# Patient Record
Sex: FEMALE | Race: OTHER | NOT HISPANIC OR LATINO
[De-identification: names, ages, dates, MRNs, and addresses within clinical notes are randomized per-mention and may not be internally consistent; named-entity substitution may affect disease eponyms.]

---

## 2019-12-16 ENCOUNTER — RESULT REVIEW (OUTPATIENT)
Age: 69
End: 2019-12-16

## 2023-11-28 NOTE — ASU PATIENT PROFILE, ADULT - REASON FOR ADMISSION, PROFILE
Pars Plana Vitrectomy, Membrane Peel, Possible Inner Limiting Membrane Peel, Possible Gas Insertion, Right Eye

## 2023-11-28 NOTE — ASU PATIENT PROFILE, ADULT - NSICDXPASTSURGICALHX_GEN_ALL_CORE_FT
PAST SURGICAL HISTORY:  History of gastric polyp removal 9/2010    S/P hysterectomy 1/2001    S/P partial lobectomy of lung bilateral    S/P right knee surgery

## 2023-11-28 NOTE — ASU PATIENT PROFILE, ADULT - NSTOBACCONEVERSMOKERY/N_GEN_A
Follow up with your PCP in 3 - 4 days if worsening or not improving:    Rona Toth MD  [address] [place] / [place] [address]  [phone]      Patient Education     Reducing the Risk of Middle Ear Infections     Good handwashing can help your child prevent ear infections.     Most children have had at least one middle ear infection by the age of 2. Treatment may depend on whether the problem is acute or chronic. It also depends on how often it comes back and how long it lasts.  Reducing risk factors  Some behaviors or surroundings increase your child’s risk of ear infection. Reducing such risk factors can be helpful at any point in treatment. The tips below may help:  · If your child goes to group , he or she runs a greater risk of getting colds or flu. This may then lead to an ear infection. Help prevent these illnesses by teaching your child to wash his or her hands often.  · If your child has nasal allergies, do your best to control dust, mold, mildew, and pet hair in the house. Also stop or greatly limit your child’s contact with secondhand smoke.  · If food allergies are a problem, identify the food that triggers the reaction. Help your child avoid it.  Watching and waiting  Sometimes it is better to proceed with caution in the following ways:  · If your child is diagnosed with an ear infection, the healthcare provider may prescribe antibiotics and will suggest a period of “watchful waiting.” This means not filling any prescriptions right away. Instead of antibiotics, a trial of medicines to relieve symptoms including those for pain or fever, is advised. This is along with waiting some time to see if a child improves without antibiotic therapy. Whether or not your healthcare provider prescribes immediate antibiotics or a period of watchful waiting depends on your child's age and risk factors.  · During this time, your child should be watched to see if his or her symptoms are improving and  to make sure new symptoms, such as fever or vomiting, don't develop. If a child doesn't improve within a few days or develops new symptoms, antibiotics will usually be started.    Date Last Reviewed: 12/1/2016 © 2000-2018 [employer]. [address] [place] [address]. All rights reserved. This information is not intended as a substitute for professional medical care. Always follow your healthcare professional's instructions.           Patient Education     Coronavirus Disease 2019 (COVID-19): Overview  Coronavirus disease 2019 (COVID-19) is a respiratory illness. It's caused by a new (novel) coronavirus. There are many types of coronavirus. Coronaviruses are a very common cause of colds and bronchitis. They may sometimes cause lung infection (pneumonia). Symptoms can range from mild to severe. Some people have no symptoms. These viruses are also found in some animals.   All 50 states in the [place] have reported cases of COVID-19. Most states report \"community spread\" of COVID-19. This means the source of the illness is not known. COVID-19 is a rapidly-emerging infectious disease. This means that scientists are actively researching it. There are information updates regularly.   Public health officials are working to find the source. How the virus spreads is not yet fully understood, but it seems to spread and infect people fairly easily. Some people who have been infected in an area may not be sure how or where they were infected. The virus may be spread through droplets of fluid that a person coughs or sneezes into the air. It may be spread if you touch a surface with the virus on it, such as a handle or object, and then touch your eyes, nose, or mouth.   For the latest information, visit the CDC website at www.cdc.gov/coronavirus/2019-ncov. Or call 072-RNW-XANQ (705-680-8028).   What are the symptoms of COVID-19?  Some people have no symptoms or mild symptoms. Symptoms can also vary from  person to person. As experts learn more about COVID-19, other symptoms are being reported. Symptoms may appear 2 to 14 days after contact with the virus:   · Fever  · Coughing  · Trouble breathing or feeling short of breath  · Sore throat  · Runny nose  · Headache and body aches  · Chills or repeated shaking with chills  · Fatigue  · Loss of appetite  · Nausea, vomiting, diarrhea, or abdominal pain  · Loss of sense of smell and taste  You can check your symptoms with the CDC’s Coronavirus Self-.   What are possible complications from COVID-19?  In many cases, this virus can cause infection (pneumonia) in both lungs. In some cases, this can cause death. Certain people are at higher risk for complications. This includes older adults and people with serious chronic health conditions such as heart or lung disease, diabetes, or kidney disease. It includes people with health conditions that suppress the immune system. And it includes people taking medicines that suppress the immune system.   As experts learn more about COVID-19, other complications are being reported that may be linked to COVID-19. Rarely, some children have developed severe complications called multisystem inflammatory syndrome in children (MIS-C). MIS-C seems to be similar to [place] disease, a rare condition causing inflammation of blood vessels and body organs. It's not yet known if MIS-C happens only in children, or if adults are also at risk. It's also not known if it's related to COVID-19, because many children, but not all, have tested positive for the virus. Experts continue to study MIS-C. The CDC advises healthcare providers to report to local health departments any person under age 21 years old who is ill enough to be in the hospital and has all of the following:   · A fever over 100.4°F (38.0°C) for more than 24 hours and a positive SARS-CoV-2 test or exposure to the virus in the last 4 weeks  · Inflammation in at least 2 organs such  as the heart, lungs, or kidneys with lab tests that show inflammation  · No other diagnoses besides COVID-19 explain the child's symptoms  How is COVID-19 diagnosed?  Your healthcare provider will ask about your symptoms. He or she will ask where you live, and about your recent travel, and any contact with sick people. If your healthcare provider thinks you may have COVID-19, he or she will consider whether to test you for COVID-19. This depends on the availability of testing in your area, and how sick you are. Follow all instructions from your healthcare provider. Guidelines for testing may change as more information about the virus becomes available. Currently, COVID-19 is diagnosed by:   · Nose-throat swab.  A swab is wiped inside your nose to the back of your throat. This is a viral test to tell you if you have a current COVID-19 infection.  If your healthcare provider thinks or confirms that you have COVID-19, you may have other tests. These tests may include:   · Antibody blood test.  Antibody tests are being looked at to find out if a person has previously been infected with the virus and may now have antibodies such as SARS AB IgG in their blood to give some immunity. The accuracy and availability of antibody tests vary. An antibody test may not be able to show if you have a current infection because it can take up to a few weeks after infection to make antibodies. It's not yet known how long immunity lasts after being infected with the virus.  · Sputum culture.  A small sample of mucus coughed from your lungs (sputum) may be collected if you have a moist cough. It may be checked for the virus or to look for pneumonia.  · Imaging tests.  You may have a chest X-ray or CT scan.  How is COVID-19 treated?  There is currently no medicine proven to prevent or treat the virus. Some experimental medicines are being tested for COVID-19. Other medicines used to treat other conditions are being looked at for COVID-19,  but these are not currently approved to treat it.   The most proven treatments right now are those to help your body while it fights the virus. This is known as supportive care. Supportive care may include:   · Getting rest.  This helps your body fight the illness.  · Staying hydrated.  Drinking liquids is the best way to prevent dehydration.. Try to drink 6 to 8 glasses of liquids every day, or as advised by your provider. Also check with your provider about which fluids are best for you. Don't drink fluids that contain caffeine or alcohol.  · Taking over-the-counter (OTC) pain medicine.  These are used to help ease pain and reduce fever. Follow your healthcare provider's instructions for which OTC medicine to use.  For severe illness, you may need to stay in the hospital. Care during severe illness may include:   · IV (intravenous) fluids.  These are given through a vein to help keep your body hydrated.  · Oxygen. You may be given supplemental oxygen or ventilation with a breathing machine (ventilator). This is done so you get enough oxygen in your body.  · Prone positioning.  Depending on how sick you are during your hospital stay, your healthcare team may turn you regularly on your stomach. This is called prone positioning. It helps increase the amount of oxygen you get to your lungs. Follow your healthcare team's instructions on position changes while you're in the hospital. Also follow their discharge advice on the best positions to help your breathing once you go home.  People who have had COVID-19 and are fully recovered may be asked by their healthcare team to consider donating plasma. This is called COVID-19 convalescent plasma donation. Plasma from people fully recovered from COVID-19 may contain antibodies to help fight COVID-19 in people who are currently seriously ill with the disease. It's not fully known if the donated plasma will work well as a treatment, but the FDA is looking at it and has asked  [employer] to help with plasma donation and collection. Talk with your provider to learn more about convalescent plasma donation and whether you qualify to donate.   Are you at risk for COVID-19?  You are at risk for COVID-19 if you have had close contact with someone with the virus, or if you live in or traveled to an area with cases of it. Close contact means being within about 6 feet of someone, or living in the same house or visiting a person who has or may have COVID-19. Some recent studies suggest that COVID-19 may be spread by people who are not showing symptoms.   Date last modified: 5/20/2020  [employer] last reviewed this educational content on 1/1/2020  © 2731-2576 [employer]. [address] [place] [address], [place] [address]. All rights reserved. This information is not intended as a substitute for professional medical care. Always follow your healthcare professional's instructions.            [facility]

## 2023-11-28 NOTE — ASU PATIENT PROFILE, ADULT - NS PREOP UNDERSTANDS INFO
No solid food/dairy/candy/gum after 9:30pm  tonight; water allowed before 04:30am tomorrow; patient to come with photo ID/insurance/credit card; no jewelries/contact lens/valuables; dress in comfortable clothes; no smoking/alcohol drinking/recreational drug use tonight; escort to come with photo ID; address and callback number was given/yes

## 2023-11-29 ENCOUNTER — TRANSCRIPTION ENCOUNTER (OUTPATIENT)
Age: 73
End: 2023-11-29

## 2023-11-29 ENCOUNTER — OUTPATIENT (OUTPATIENT)
Dept: OUTPATIENT SERVICES | Facility: HOSPITAL | Age: 73
LOS: 1 days | Discharge: HOME | End: 2023-11-29

## 2023-11-29 VITALS
TEMPERATURE: 99 F | DIASTOLIC BLOOD PRESSURE: 55 MMHG | HEART RATE: 65 BPM | RESPIRATION RATE: 16 BRPM | SYSTOLIC BLOOD PRESSURE: 125 MMHG | OXYGEN SATURATION: 95 %

## 2023-11-29 VITALS — SYSTOLIC BLOOD PRESSURE: 173 MMHG | DIASTOLIC BLOOD PRESSURE: 75 MMHG | HEART RATE: 51 BPM

## 2023-11-29 DIAGNOSIS — Z90.710 ACQUIRED ABSENCE OF BOTH CERVIX AND UTERUS: Chronic | ICD-10-CM

## 2023-11-29 DIAGNOSIS — Z90.2 ACQUIRED ABSENCE OF LUNG [PART OF]: Chronic | ICD-10-CM

## 2023-11-29 DIAGNOSIS — Z98.890 OTHER SPECIFIED POSTPROCEDURAL STATES: Chronic | ICD-10-CM

## 2023-11-29 DIAGNOSIS — Z87.19 PERSONAL HISTORY OF OTHER DISEASES OF THE DIGESTIVE SYSTEM: Chronic | ICD-10-CM

## 2023-11-29 RX ORDER — ATROPINE SULFATE 1 %
1 DROPS OPHTHALMIC (EYE)
Refills: 0 | Status: COMPLETED | OUTPATIENT
Start: 2023-11-29 | End: 2023-11-29

## 2023-11-29 RX ORDER — PHENYLEPHRINE HCL 2.5 %
1 DROPS OPHTHALMIC (EYE)
Refills: 0 | Status: COMPLETED | OUTPATIENT
Start: 2023-11-29 | End: 2023-11-29

## 2023-11-29 RX ORDER — HYDROCHLOROTHIAZIDE 25 MG
1 TABLET ORAL
Refills: 0 | DISCHARGE

## 2023-11-29 RX ORDER — OSIMERTINIB 80 1/1
80 TABLET, FILM COATED ORAL
Refills: 0 | DISCHARGE

## 2023-11-29 RX ORDER — LOSARTAN POTASSIUM 100 MG/1
1 TABLET, FILM COATED ORAL
Refills: 0 | DISCHARGE

## 2023-11-29 RX ORDER — SODIUM CHLORIDE 9 MG/ML
500 INJECTION, SOLUTION INTRAVENOUS
Refills: 0 | Status: DISCONTINUED | OUTPATIENT
Start: 2023-11-29 | End: 2023-11-29

## 2023-11-29 RX ORDER — TROPICAMIDE 1 %
1 DROPS OPHTHALMIC (EYE)
Refills: 0 | Status: COMPLETED | OUTPATIENT
Start: 2023-11-29 | End: 2023-11-29

## 2023-11-29 RX ORDER — AMLODIPINE BESYLATE 2.5 MG/1
1 TABLET ORAL
Refills: 0 | DISCHARGE

## 2023-11-29 RX ORDER — FENTANYL CITRATE 50 UG/ML
25 INJECTION INTRAVENOUS
Refills: 0 | Status: DISCONTINUED | OUTPATIENT
Start: 2023-11-29 | End: 2023-11-29

## 2023-11-29 RX ORDER — CARVEDILOL PHOSPHATE 80 MG/1
1 CAPSULE, EXTENDED RELEASE ORAL
Refills: 0 | DISCHARGE

## 2023-11-29 RX ADMIN — Medication 1 DROP(S): at 06:55

## 2023-11-29 RX ADMIN — Medication 1 DROP(S): at 06:50

## 2023-11-29 RX ADMIN — Medication 1 DROP(S): at 07:00

## 2023-11-29 NOTE — ASU DISCHARGE PLAN (ADULT/PEDIATRIC) - NS MD DC FALL RISK RISK
For information on Fall & Injury Prevention, visit: [email or web address] OR  [email or web address] OR  https://www.cdc.gov/steadi/patient.html

## 2023-11-29 NOTE — OPERATIVE REPORT - NSICDXBRIEFPROCEDURE_GEN_ALL_CORE_FT
PROCEDURES:  Vitrectomy, mechanical, pars plana approach, with removal of internal limiting membrane of retina 29-Nov-2023 08:29:15  Geovanny Ellington

## 2023-11-29 NOTE — PRE-ANESTHESIA EVALUATION ADULT - NSANTHOSAYNRD_GEN_A_CORE
No. GISELLA screening performed.  STOP BANG Legend: 0-2 = LOW Risk; 3-4 = INTERMEDIATE Risk; 5-8 = HIGH Risk

## 2023-11-29 NOTE — OPERATIVE REPORT - OPERATIVE RPOSRT DETAILS
REPAIR OF MACULAR PUCKER     PATIENT NAME: TWAN OWENS    ATTENDING: Geovanny Ellington MD    PREOPERATIVE DIAGNOSIS(ES):  Macular pucker, right eye     POSTOPERATIVE DIAGNOSIS(ES):  Macular pucker, right eye      PROCEDURE: Pars plana vitrectomy, membrane peel with removal of internal limiting membrane, air-fluid exchange - all of the right eye     INDICATIONS:       The patient is a 73y year-old Female  with a history of a macular pucker in the operative eye. After a detailed review of the risks, benefits and alternatives of the procedure, including but not limited to bleeding, infection, inflammation, retinal detachment, loss of vision, loss of eye, double vision, unclear visual potential, need for further surgery, and systemic risks of anesthesia and surgery, informed consent was obtained and the patient elected to proceed with the surgery.      PROCEDURE:       On the day of surgery, after clearance by medicine and anesthesia, the patient was brought to the operating room in stable condition. After verifying the correct surgical site, the patient was placed in the supine position. Intravenous sedation was provided by the anesthesia team.  After a brief time-out, a 1:1 mixture of 2% lidocaine and 0.75% Marcaine was injected in a retrobulbar fashion behind the operative eye. The patient was then prepped and draped in the usual sterile fashion.  Eyelashes were draped out of the operative field and a stainless steel eyelid speculum was placed in the operative eye.  A time-out was performed verifying correct patient, procedure, site, positioning and special equipment prior to starting the case.      A 25-gauge microcannula was placed in the inferotemporal quadrant in a beveled fashion 3.5 mm posterior to the limbus. The infusion cannula was cleared of air, inserted into the microcannula, confirmed to be in the correct position within the vitreous cavity by direct visualization through the dilated pupil with the light pipe and then turned on under direct visualization. Two additional microcannulas were placed superonasally and superotemporally in a similar fashion. The vitrectomy hand piece and light were then inserted into the eye and the anterior vitreous was cleared under direct visualization.       Then, under indirect wide field visualization, a core and peripheral vitrectomy was performed. The posterior hyaloid was  using active aspiration and Kenalog assistance.  The vitreous was excised 360 degrees to the posterior vitreous base and no retinal breaks were noted.        Attention was then turned to the macular region. Using a macular lens, ILM forceps and ICG, the macular pucker and ILM were removed in a double peel from arcade to arcade without complication. Following the peel, 360 degree scleral indentation was performed.  No holes, tears, or detachments were identified.        A partial air-fluid exchange was performed. The microcannulas were removed from the eye. All sclerotomies remained airtight and the eye remained at a physiologic pressure by palpation.     The eyelid speculum was removed from the eye. Betadine was cleaned from around the eye. A topical steroid/antibiotic cream was placed on the eye, followed by a patch and a clear plastic shield. The patient tolerated the procedure well and left the operating room in stable condition.

## 2024-09-05 ENCOUNTER — OFFICE (OUTPATIENT)
Dept: URBAN - METROPOLITAN AREA CLINIC 28 | Facility: CLINIC | Age: 74
Setting detail: OPHTHALMOLOGY
End: 2024-09-05
Payer: MEDICARE

## 2024-09-05 ENCOUNTER — RX ONLY (RX ONLY)
Age: 74
End: 2024-09-05

## 2024-09-05 DIAGNOSIS — H40.033: ICD-10-CM

## 2024-09-05 DIAGNOSIS — H25.11: ICD-10-CM

## 2024-09-05 DIAGNOSIS — H16.222: ICD-10-CM

## 2024-09-05 DIAGNOSIS — H11.153: ICD-10-CM

## 2024-09-05 DIAGNOSIS — H35.372: ICD-10-CM

## 2024-09-05 DIAGNOSIS — H02.834: ICD-10-CM

## 2024-09-05 DIAGNOSIS — H25.13: ICD-10-CM

## 2024-09-05 DIAGNOSIS — H02.831: ICD-10-CM

## 2024-09-05 PROCEDURE — 92004 COMPRE OPH EXAM NEW PT 1/>: CPT | Performed by: OPHTHALMOLOGY

## 2024-09-05 PROCEDURE — 92136 OPHTHALMIC BIOMETRY: CPT | Mod: TC | Performed by: OPHTHALMOLOGY

## 2024-09-05 PROCEDURE — 92134 CPTRZ OPH DX IMG PST SGM RTA: CPT | Performed by: OPHTHALMOLOGY

## 2024-09-05 PROCEDURE — 92136 OPHTHALMIC BIOMETRY: CPT | Mod: 26,RT | Performed by: OPHTHALMOLOGY

## 2024-09-05 PROCEDURE — 92202 OPSCPY EXTND ON/MAC DRAW: CPT | Performed by: OPHTHALMOLOGY

## 2024-09-05 ASSESSMENT — LID EXAM ASSESSMENTS
OS_DERMATOCHALASIS: 1+
OD_DERMATOCHALASIS: 1+
OD_MEIBOMITIS: 1+
OS_MEIBOMITIS: 1+

## 2024-09-05 ASSESSMENT — CONFRONTATIONAL VISUAL FIELD TEST (CVF)
OS_FINDINGS: FULL
OD_FINDINGS: FULL

## 2024-09-05 ASSESSMENT — LID POSITION - DERMATOCHALASIS
OD_DERMATOCHALASIS: RUL 1+
OS_DERMATOCHALASIS: LUL 1+

## 2024-09-25 ENCOUNTER — RX ONLY (RX ONLY)
Age: 74
End: 2024-09-25

## 2024-10-01 ENCOUNTER — AMBULATORY SURGERY CENTER (OUTPATIENT)
Dept: URBAN - METROPOLITAN AREA CLINIC 75 | Facility: CLINIC | Age: 74
Setting detail: OPHTHALMOLOGY
End: 2024-10-01
Payer: MEDICARE

## 2024-10-01 DIAGNOSIS — H52.211: ICD-10-CM

## 2024-10-01 DIAGNOSIS — H25.11: ICD-10-CM

## 2024-10-01 PROCEDURE — A9270 NON-COVERED ITEM OR SERVICE: HCPCS | Mod: GY | Performed by: OPHTHALMOLOGY

## 2024-10-01 PROCEDURE — 66984 XCAPSL CTRC RMVL W/O ECP: CPT | Mod: RT | Performed by: OPHTHALMOLOGY

## 2024-10-01 PROCEDURE — FEMTO FEMTOSECOND LASER: Mod: GY | Performed by: OPHTHALMOLOGY

## 2024-10-02 ENCOUNTER — OFFICE (OUTPATIENT)
Dept: URBAN - METROPOLITAN AREA CLINIC 28 | Facility: CLINIC | Age: 74
Setting detail: OPHTHALMOLOGY
End: 2024-10-02
Payer: MEDICARE

## 2024-10-02 DIAGNOSIS — Z96.1: ICD-10-CM

## 2024-10-02 PROBLEM — H02.831 DERMATOCHALASIS; RIGHT UPPER LID, LEFT UPPER LID: Status: ACTIVE | Noted: 2024-09-05

## 2024-10-02 PROBLEM — H35.372 EPIRETINAL MEMBRANE; LEFT EYE: Status: ACTIVE | Noted: 2024-09-05

## 2024-10-02 PROBLEM — H16.222 DRY EYE SYNDROME K SICCA; LEFT EYE: Status: ACTIVE | Noted: 2024-09-05

## 2024-10-02 PROBLEM — H02.834 DERMATOCHALASIS; RIGHT UPPER LID, LEFT UPPER LID: Status: ACTIVE | Noted: 2024-09-05

## 2024-10-02 PROCEDURE — 99024 POSTOP FOLLOW-UP VISIT: CPT | Performed by: OPHTHALMOLOGY

## 2024-10-02 ASSESSMENT — LID POSITION - DERMATOCHALASIS
OD_DERMATOCHALASIS: RUL 1+
OS_DERMATOCHALASIS: LUL 1+

## 2024-10-02 ASSESSMENT — REFRACTION_CURRENTRX
OD_OVR_VA: 20/
OD_OVR_VA: 20/
OD_VPRISM_DIRECTION: PROGS
OS_CYLINDER: -0.50
OS_ADD: +2.25
OS_AXIS: 038
OD_AXIS: 127
OS_CYLINDER: -0.50
OS_SPHERE: +3.75
OS_OVR_VA: 20/
OS_VPRISM_DIRECTION: PROGS
OS_AXIS: 044
OD_CYLINDER: -1.25
OS_SPHERE: +1.50
OD_SPHERE: +2.75
OD_SPHERE: +0.50
OD_ADD: +2.25
OS_OVR_VA: 20/
OD_CYLINDER: -1.25
OD_AXIS: 125

## 2024-10-02 ASSESSMENT — SUPERFICIAL PUNCTATE KERATITIS (SPK): OS_SPK: T

## 2024-10-02 ASSESSMENT — LID EXAM ASSESSMENTS
OD_MEIBOMITIS: 1+
OS_DERMATOCHALASIS: 1+
OS_MEIBOMITIS: 1+
OD_DERMATOCHALASIS: 1+

## 2024-10-02 ASSESSMENT — REFRACTION_MANIFEST
OD_AXIS: 125
OD_CYLINDER: -1.25
OD_VA1: 20/100
OD_SPHERE: -1.25

## 2024-10-02 ASSESSMENT — KERATOMETRY
OD_AXISANGLE_DEGREES: 060
OD_K2POWER_DIOPTERS: 44.25
OS_K1POWER_DIOPTERS: 43.75
OS_K2POWER_DIOPTERS: 44.25
OS_AXISANGLE_DEGREES: 095
OD_K1POWER_DIOPTERS: 43.75

## 2024-10-02 ASSESSMENT — TONOMETRY: OD_IOP_MMHG: 12

## 2024-10-02 ASSESSMENT — REFRACTION_AUTOREFRACTION
OS_CYLINDER: -0.75
OS_SPHERE: +1.50
OD_SPHERE: -1.25
OD_AXIS: 104
OS_AXIS: 039
OD_CYLINDER: -1.25

## 2024-10-02 ASSESSMENT — VISUAL ACUITY
OS_BCVA: 20/80
OD_BCVA: 20/20-1

## 2024-10-09 ENCOUNTER — OFFICE (OUTPATIENT)
Dept: URBAN - METROPOLITAN AREA CLINIC 28 | Facility: CLINIC | Age: 74
Setting detail: OPHTHALMOLOGY
End: 2024-10-09
Payer: MEDICARE

## 2024-10-09 DIAGNOSIS — Z96.1: ICD-10-CM

## 2024-10-09 PROCEDURE — 99024 POSTOP FOLLOW-UP VISIT: CPT | Performed by: OPHTHALMOLOGY

## 2024-10-09 ASSESSMENT — REFRACTION_CURRENTRX
OD_AXIS: 125
OS_OVR_VA: 20/
OD_OVR_VA: 20/
OD_CYLINDER: -1.25
OD_CYLINDER: -1.25
OD_ADD: +2.25
OD_VPRISM_DIRECTION: PROGS
OS_AXIS: 044
OD_OVR_VA: 20/
OS_AXIS: 038
OD_SPHERE: +2.75
OS_ADD: +2.25
OS_SPHERE: +1.50
OS_VPRISM_DIRECTION: PROGS
OS_CYLINDER: -0.50
OD_SPHERE: +0.50
OS_CYLINDER: -0.50
OS_SPHERE: +3.75
OD_AXIS: 127
OS_OVR_VA: 20/

## 2024-10-09 ASSESSMENT — VISUAL ACUITY
OS_BCVA: 20/50-
OD_BCVA: 20/20-1

## 2024-10-09 ASSESSMENT — LID EXAM ASSESSMENTS
OS_DERMATOCHALASIS: 1+
OD_DERMATOCHALASIS: 1+
OD_MEIBOMITIS: 1+
OS_MEIBOMITIS: 1+

## 2024-10-09 ASSESSMENT — KERATOMETRY
OS_K1POWER_DIOPTERS: 43.50
OS_AXISANGLE_DEGREES: 102
OD_AXISANGLE_DEGREES: 112
OD_K2POWER_DIOPTERS: 44.00
OS_K2POWER_DIOPTERS: 44.00
OD_K1POWER_DIOPTERS: 43.75

## 2024-10-09 ASSESSMENT — CONFRONTATIONAL VISUAL FIELD TEST (CVF)
OS_FINDINGS: FULL
OD_FINDINGS: FULL

## 2024-10-09 ASSESSMENT — LID POSITION - DERMATOCHALASIS
OS_DERMATOCHALASIS: LUL 1+
OD_DERMATOCHALASIS: RUL 1+

## 2024-10-09 ASSESSMENT — REFRACTION_AUTOREFRACTION
OS_AXIS: 051
OS_CYLINDER: -1.00
OD_CYLINDER: -0.75
OD_AXIS: 070
OS_SPHERE: +1.50
OD_SPHERE: -0.25

## 2024-10-09 ASSESSMENT — REFRACTION_MANIFEST
OD_SPHERE: -1.25
OD_CYLINDER: -1.25
OD_AXIS: 125
OD_AXIS: 070
OD_SPHERE: -0.25
OD_VA1: 20/40-2
OD_CYLINDER: -0.75
OD_VA1: 20/100

## 2024-10-09 ASSESSMENT — TONOMETRY: OD_IOP_MMHG: 20

## 2024-10-09 ASSESSMENT — SUPERFICIAL PUNCTATE KERATITIS (SPK): OS_SPK: T

## 2024-10-28 ENCOUNTER — OFFICE (OUTPATIENT)
Dept: URBAN - METROPOLITAN AREA CLINIC 28 | Facility: CLINIC | Age: 74
Setting detail: OPHTHALMOLOGY
End: 2024-10-28
Payer: MEDICARE

## 2024-10-28 DIAGNOSIS — Z96.1: ICD-10-CM

## 2024-10-28 PROBLEM — H40.031 NARROW ANGLE GLAUCOMA SUSPECT;  , RIGHT EYE: Status: ACTIVE | Noted: 2024-10-02

## 2024-10-28 PROBLEM — H11.151 PINGUECULA;  , RIGHT EYE: Status: ACTIVE | Noted: 2024-10-02

## 2024-10-28 PROCEDURE — 99024 POSTOP FOLLOW-UP VISIT: CPT | Performed by: OPHTHALMOLOGY

## 2024-10-28 ASSESSMENT — REFRACTION_MANIFEST
OD_SPHERE: -1.25
OD_VA1: 20/100
OD_VA1: 20/40-2
OD_SPHERE: -0.25
OD_CYLINDER: -0.75
OD_CYLINDER: -1.25
OD_AXIS: 125
OD_AXIS: 070

## 2024-10-28 ASSESSMENT — LID POSITION - DERMATOCHALASIS
OD_DERMATOCHALASIS: RUL 1+
OS_DERMATOCHALASIS: LUL 1+

## 2024-10-28 ASSESSMENT — REFRACTION_AUTOREFRACTION
OD_AXIS: 093
OD_CYLINDER: -0.75
OS_SPHERE: +1.50
OD_SPHERE: -0.25
OS_AXIS: 051
OS_CYLINDER: -1.00

## 2024-10-28 ASSESSMENT — LID EXAM ASSESSMENTS
OD_DERMATOCHALASIS: 1+
OD_MEIBOMITIS: 1+
OS_MEIBOMITIS: 1+
OS_DERMATOCHALASIS: 1+

## 2024-10-28 ASSESSMENT — REFRACTION_CURRENTRX
OD_SPHERE: +2.75
OS_CYLINDER: -0.50
OS_AXIS: 038
OS_OVR_VA: 20/
OD_AXIS: 127
OS_AXIS: 044
OD_OVR_VA: 20/
OS_VPRISM_DIRECTION: PROGS
OD_CYLINDER: -1.25
OS_OVR_VA: 20/
OD_OVR_VA: 20/
OD_VPRISM_DIRECTION: PROGS
OD_CYLINDER: -1.25
OD_ADD: +2.25
OD_SPHERE: +0.50
OS_ADD: +2.25
OS_CYLINDER: -0.50
OD_AXIS: 125
OS_SPHERE: +1.50
OS_SPHERE: +3.75

## 2024-10-28 ASSESSMENT — KERATOMETRY
OD_K2POWER_DIOPTERS: 44.50
OD_K1POWER_DIOPTERS: 44.00
OS_AXISANGLE_DEGREES: 099
OD_AXISANGLE_DEGREES: 075
OS_K1POWER_DIOPTERS: 43.75
OS_K2POWER_DIOPTERS: 44.25

## 2024-10-28 ASSESSMENT — TONOMETRY: OD_IOP_MMHG: 12

## 2024-10-28 ASSESSMENT — SUPERFICIAL PUNCTATE KERATITIS (SPK): OS_SPK: T

## 2024-10-28 ASSESSMENT — VISUAL ACUITY
OS_BCVA: 20/40-1
OD_BCVA: 20/20-1

## 2025-01-14 ENCOUNTER — NON-APPOINTMENT (OUTPATIENT)
Age: 75
End: 2025-01-14

## 2025-01-14 ENCOUNTER — APPOINTMENT (OUTPATIENT)
Facility: CLINIC | Age: 75
End: 2025-01-14
Payer: MEDICARE

## 2025-01-14 VITALS — SYSTOLIC BLOOD PRESSURE: 168 MMHG | DIASTOLIC BLOOD PRESSURE: 83 MMHG

## 2025-01-14 DIAGNOSIS — I10 ESSENTIAL (PRIMARY) HYPERTENSION: ICD-10-CM

## 2025-01-14 DIAGNOSIS — Z78.9 OTHER SPECIFIED HEALTH STATUS: ICD-10-CM

## 2025-01-14 DIAGNOSIS — Z80.1 FAMILY HISTORY OF MALIGNANT NEOPLASM OF TRACHEA, BRONCHUS AND LUNG: ICD-10-CM

## 2025-01-14 DIAGNOSIS — Z98.890 OTHER SPECIFIED POSTPROCEDURAL STATES: ICD-10-CM

## 2025-01-14 DIAGNOSIS — Z92.89 PERSONAL HISTORY OF OTHER MEDICAL TREATMENT: ICD-10-CM

## 2025-01-14 PROBLEM — Z00.00 ENCOUNTER FOR PREVENTIVE HEALTH EXAMINATION: Status: ACTIVE | Noted: 2025-01-14

## 2025-01-14 PROCEDURE — 99213 OFFICE O/P EST LOW 20 MIN: CPT

## 2025-01-14 PROCEDURE — 99459 PELVIC EXAMINATION: CPT

## 2025-01-23 ENCOUNTER — RX ONLY (RX ONLY)
Age: 75
End: 2025-01-23

## 2025-01-23 ENCOUNTER — OFFICE (OUTPATIENT)
Dept: URBAN - METROPOLITAN AREA CLINIC 28 | Facility: CLINIC | Age: 75
Setting detail: OPHTHALMOLOGY
End: 2025-01-23
Payer: MEDICARE

## 2025-01-23 DIAGNOSIS — H25.12: ICD-10-CM

## 2025-01-23 DIAGNOSIS — H16.222: ICD-10-CM

## 2025-01-23 DIAGNOSIS — H02.834: ICD-10-CM

## 2025-01-23 DIAGNOSIS — H02.831: ICD-10-CM

## 2025-01-23 DIAGNOSIS — H40.033: ICD-10-CM

## 2025-01-23 DIAGNOSIS — H11.153: ICD-10-CM

## 2025-01-23 DIAGNOSIS — H26.491: ICD-10-CM

## 2025-01-23 PROCEDURE — 66821 AFTER CATARACT LASER SURGERY: CPT | Mod: RT | Performed by: OPHTHALMOLOGY

## 2025-01-23 PROCEDURE — 92014 COMPRE OPH EXAM EST PT 1/>: CPT | Mod: 57 | Performed by: OPHTHALMOLOGY

## 2025-01-23 ASSESSMENT — REFRACTION_MANIFEST
OD_AXIS: 110
OD_SPHERE: -1.25
OD_VA1: 20/50+2
OD_SPHERE: -0.25
OD_VA1: 20/40-2
OD_CYLINDER: -0.75
OD_AXIS: 070
OD_CYLINDER: -1.25
OD_VA1: 20/100
OD_SPHERE: -0.25
OD_CYLINDER: -0.50
OD_AXIS: 125

## 2025-01-23 ASSESSMENT — LID EXAM ASSESSMENTS
OD_MEIBOMITIS: 1+
OS_MEIBOMITIS: 1+
OD_DERMATOCHALASIS: 1+
OS_DERMATOCHALASIS: 1+

## 2025-01-23 ASSESSMENT — KERATOMETRY
OD_K2POWER_DIOPTERS: 44.00
OD_K1POWER_DIOPTERS: 43.75
OS_K1POWER_DIOPTERS: 44.00
OS_AXISANGLE_DEGREES: 119
OS_K2POWER_DIOPTERS: 44.25
OD_AXISANGLE_DEGREES: 054

## 2025-01-23 ASSESSMENT — REFRACTION_AUTOREFRACTION
OD_CYLINDER: -0.75
OS_SPHERE: +1.00
OS_CYLINDER: -1.00
OS_AXIS: 051
OD_AXIS: 110
OD_SPHERE: 0.00

## 2025-01-23 ASSESSMENT — REFRACTION_CURRENTRX
OS_OVR_VA: 20/
OS_VPRISM_DIRECTION: PROGS
OS_CYLINDER: -0.50
OD_CYLINDER: -1.25
OD_SPHERE: +2.75
OD_OVR_VA: 20/
OD_ADD: +2.25
OD_ADD: +2.50
OS_OVR_VA: 20/
OS_ADD: +2.25
OS_SPHERE: +1.25
OD_AXIS: 120
OD_AXIS: 127
OS_CYLINDER: -0.50
OS_AXIS: 044
OS_AXIS: 038
OD_CYLINDER: -1.25
OS_VPRISM_DIRECTION: PROGS
OD_AXIS: 125
OS_SPHERE: +3.75
OD_OVR_VA: 20/
OS_ADD: +2.50
OS_AXIS: 040
OD_VPRISM_DIRECTION: PROGS
OD_SPHERE: +0.50
OD_VPRISM_DIRECTION: PROGS
OD_SPHERE: +0.25
OS_OVR_VA: 20/
OD_OVR_VA: 20/
OS_CYLINDER: -0.50
OD_CYLINDER: -1.25
OS_SPHERE: +1.50

## 2025-01-23 ASSESSMENT — TONOMETRY: OD_IOP_MMHG: 16

## 2025-01-23 ASSESSMENT — CONFRONTATIONAL VISUAL FIELD TEST (CVF)
OS_FINDINGS: FULL
OD_FINDINGS: FULL

## 2025-01-23 ASSESSMENT — SUPERFICIAL PUNCTATE KERATITIS (SPK): OS_SPK: T

## 2025-01-23 ASSESSMENT — LID POSITION - DERMATOCHALASIS
OS_DERMATOCHALASIS: LUL 1+
OD_DERMATOCHALASIS: RUL 1+

## 2025-01-23 ASSESSMENT — VISUAL ACUITY
OS_BCVA: 20/40-2
OD_BCVA: 20/30-2

## 2025-01-30 ENCOUNTER — OFFICE (OUTPATIENT)
Dept: URBAN - METROPOLITAN AREA CLINIC 28 | Facility: CLINIC | Age: 75
Setting detail: OPHTHALMOLOGY
End: 2025-01-30
Payer: MEDICARE

## 2025-01-30 DIAGNOSIS — H11.151: ICD-10-CM

## 2025-01-30 DIAGNOSIS — H02.834: ICD-10-CM

## 2025-01-30 DIAGNOSIS — Z96.1: ICD-10-CM

## 2025-01-30 DIAGNOSIS — H35.372: ICD-10-CM

## 2025-01-30 DIAGNOSIS — H02.831: ICD-10-CM

## 2025-01-30 DIAGNOSIS — H40.031: ICD-10-CM

## 2025-01-30 PROBLEM — H26.491 POSTERIOR CAPSULAR OPACIFICATION; RIGHT EYE: Status: RESOLVED | Noted: 2025-01-23 | Resolved: 2025-01-30

## 2025-01-30 PROCEDURE — 99024 POSTOP FOLLOW-UP VISIT: CPT | Performed by: OPHTHALMOLOGY

## 2025-01-30 ASSESSMENT — REFRACTION_MANIFEST
OS_AXIS: 040
OD_AXIS: 110
OD_VA1: 20/30
OD_CYLINDER: -0.75
OD_CYLINDER: -0.50
OD_AXIS: 110
OD_CYLINDER: -0.25
OD_SPHERE: -0.25
OD_SPHERE: -1.25
OS_VA1: 20/20
OD_AXIS: 070
OD_SPHERE: -0.25
OD_AXIS: 125
OS_CYLINDER: -0.50
OD_VA1: 20/100
OS_SPHERE: +0.50
OD_SPHERE: -0.25
OD_VA1: 20/40-2
OD_CYLINDER: -1.25
OD_VA1: 20/50+2

## 2025-01-30 ASSESSMENT — REFRACTION_CURRENTRX
OD_AXIS: 127
OS_AXIS: 044
OS_OVR_VA: 20/
OS_CYLINDER: -0.50
OS_CYLINDER: -0.50
OS_AXIS: 040
OD_SPHERE: +0.25
OD_SPHERE: +2.75
OD_SPHERE: +0.50
OD_ADD: +2.50
OS_VPRISM_DIRECTION: PROGS
OD_OVR_VA: 20/
OD_CYLINDER: -1.25
OD_VPRISM_DIRECTION: PROGS
OS_VPRISM_DIRECTION: PROGS
OS_ADD: +2.25
OS_SPHERE: +1.25
OS_AXIS: 038
OD_OVR_VA: 20/
OD_VPRISM_DIRECTION: PROGS
OD_AXIS: 120
OS_OVR_VA: 20/
OD_CYLINDER: -1.25
OD_AXIS: 125
OS_OVR_VA: 20/
OD_ADD: +2.25
OD_OVR_VA: 20/
OS_SPHERE: +3.75
OS_ADD: +2.50
OD_CYLINDER: -1.25
OS_CYLINDER: -0.50
OS_SPHERE: +1.50

## 2025-01-30 ASSESSMENT — REFRACTION_AUTOREFRACTION
OS_SPHERE: +1.00
OD_CYLINDER: -0.75
OS_AXIS: 051
OD_AXIS: 110
OD_SPHERE: 0.00
OS_CYLINDER: -1.00

## 2025-01-30 ASSESSMENT — CONFRONTATIONAL VISUAL FIELD TEST (CVF)
OD_FINDINGS: FULL
OS_FINDINGS: FULL

## 2025-01-30 ASSESSMENT — LID EXAM ASSESSMENTS
OD_MEIBOMITIS: 1+
OS_DERMATOCHALASIS: 1+
OS_MEIBOMITIS: 1+
OD_DERMATOCHALASIS: 1+

## 2025-01-30 ASSESSMENT — KERATOMETRY
OD_K2POWER_DIOPTERS: 44.00
OD_K1POWER_DIOPTERS: 43.75
OD_AXISANGLE_DEGREES: 054
OS_K1POWER_DIOPTERS: 44.00
OS_AXISANGLE_DEGREES: 119
OS_K2POWER_DIOPTERS: 44.25

## 2025-01-30 ASSESSMENT — LID POSITION - DERMATOCHALASIS
OS_DERMATOCHALASIS: LUL 1+
OD_DERMATOCHALASIS: RUL 1+

## 2025-01-30 ASSESSMENT — VISUAL ACUITY
OS_BCVA: 20/40-2
OD_BCVA: 20/30-2

## 2025-01-30 ASSESSMENT — SUPERFICIAL PUNCTATE KERATITIS (SPK): OS_SPK: T

## 2025-01-30 ASSESSMENT — TONOMETRY: OD_IOP_MMHG: 14

## (undated) DEVICE — SUT POLYSORB 8-0 5" MV-135-5

## (undated) DEVICE — DRSG TAPE TRANSPORE 1"

## (undated) DEVICE — Device

## (undated) DEVICE — PACK VITRECTOMY  LF

## (undated) DEVICE — PACK VITRECTOMY CONSTELLATION POST 25G 10K

## (undated) DEVICE — ILM RESOLUTION FORCEP 25G DISP

## (undated) DEVICE — CANNULA ALCON SOFT TIP 25G

## (undated) DEVICE — MILLEX HA 45UM

## (undated) DEVICE — CANNULA BLUNT TIP 25GA

## (undated) DEVICE — GLV 7.5 PROTEXIS (WHITE)

## (undated) DEVICE — DRAPE MICROSCOPE KNOB COVER SMALL (2 PCS)